# Patient Record
Sex: MALE | Race: WHITE | NOT HISPANIC OR LATINO
[De-identification: names, ages, dates, MRNs, and addresses within clinical notes are randomized per-mention and may not be internally consistent; named-entity substitution may affect disease eponyms.]

---

## 2018-07-28 PROBLEM — Z00.00 ENCOUNTER FOR PREVENTIVE HEALTH EXAMINATION: Status: ACTIVE | Noted: 2018-07-28

## 2018-11-19 ENCOUNTER — NON-APPOINTMENT (OUTPATIENT)
Age: 83
End: 2018-11-19

## 2018-11-19 ENCOUNTER — APPOINTMENT (OUTPATIENT)
Dept: CARDIOLOGY | Facility: CLINIC | Age: 83
End: 2018-11-19
Payer: MEDICARE

## 2018-11-19 VITALS
SYSTOLIC BLOOD PRESSURE: 155 MMHG | DIASTOLIC BLOOD PRESSURE: 84 MMHG | HEART RATE: 76 BPM | OXYGEN SATURATION: 97 % | HEIGHT: 69 IN | WEIGHT: 200 LBS | BODY MASS INDEX: 29.62 KG/M2

## 2018-11-19 DIAGNOSIS — Z78.9 OTHER SPECIFIED HEALTH STATUS: ICD-10-CM

## 2018-11-19 DIAGNOSIS — Z86.39 PERSONAL HISTORY OF OTHER ENDOCRINE, NUTRITIONAL AND METABOLIC DISEASE: ICD-10-CM

## 2018-11-19 DIAGNOSIS — N40.0 BENIGN PROSTATIC HYPERPLASIA WITHOUT LOWER URINARY TRACT SYMPMS: ICD-10-CM

## 2018-11-19 PROCEDURE — 99204 OFFICE O/P NEW MOD 45 MIN: CPT

## 2018-11-19 PROCEDURE — 93000 ELECTROCARDIOGRAM COMPLETE: CPT

## 2018-11-19 RX ORDER — TIMOLOL MALEATE 5 MG/ML
0.5 SOLUTION OPHTHALMIC
Refills: 0 | Status: ACTIVE | COMMUNITY

## 2018-11-19 NOTE — PHYSICAL EXAM
[General Appearance - Well Developed] : well developed [Normal Appearance] : normal appearance [Well Groomed] : well groomed [General Appearance - Well Nourished] : well nourished [No Deformities] : no deformities [General Appearance - In No Acute Distress] : no acute distress [Normal Conjunctiva] : the conjunctiva exhibited no abnormalities [Eyelids - No Xanthelasma] : the eyelids demonstrated no xanthelasmas [Normal Oral Mucosa] : normal oral mucosa [No Oral Pallor] : no oral pallor [No Oral Cyanosis] : no oral cyanosis [Normal Jugular Venous A Waves Present] : normal jugular venous A waves present [Normal Jugular Venous V Waves Present] : normal jugular venous V waves present [No Jugular Venous Dixon A Waves] : no jugular venous dixon A waves [Heart Rate And Rhythm] : heart rate and rhythm were normal [Heart Sounds] : normal S1 and S2 [Murmurs] : no murmurs present [Respiration, Rhythm And Depth] : normal respiratory rhythm and effort [Exaggerated Use Of Accessory Muscles For Inspiration] : no accessory muscle use [Auscultation Breath Sounds / Voice Sounds] : lungs were clear to auscultation bilaterally [Abdomen Soft] : soft [Abdomen Tenderness] : non-tender [Abdomen Mass (___ Cm)] : no abdominal mass palpated [Abnormal Walk] : normal gait [Gait - Sufficient For Exercise Testing] : the gait was sufficient for exercise testing [Nail Clubbing] : no clubbing of the fingernails [Cyanosis, Localized] : no localized cyanosis [Petechial Hemorrhages (___cm)] : no petechial hemorrhages [Skin Color & Pigmentation] : normal skin color and pigmentation [] : no rash [No Venous Stasis] : no venous stasis [Skin Lesions] : no skin lesions [No Skin Ulcers] : no skin ulcer [No Xanthoma] : no  xanthoma was observed [Oriented To Time, Place, And Person] : oriented to person, place, and time [Affect] : the affect was normal [Mood] : the mood was normal [No Anxiety] : not feeling anxious

## 2018-11-19 NOTE — REASON FOR VISIT
[Initial Evaluation] : an initial evaluation of [Medication Management] : Medication management [Abnormal ECG] : an abnormal ECG [Hyperlipidemia] : hyperlipidemia

## 2018-11-20 NOTE — HISTORY OF PRESENT ILLNESS
[FreeTextEntry1] : KEVIN RON  is a 84 year old  M\par Self referred for CV risk assessment\par h/o HL, borderline HTN, pneumonia, overweight, alcohol misuse, overweight\par Takes preventive crestor rx\par \par Previously told of scar in heart ?!\par There is no prior history of a clinical myocardial infarction, coronary revascularization. \par There is no history of symptomatic congestive heart failure rheumatic heart disease or valvular disease.\par There is no history of symptomatic arrhythmias including atrial fibrillation.\par \par There is no exertional chest pain, pressure or discomfort. \par There is no significant dyspnea on exertion or orthopnea. \par There are no symptomatic palpitations, lightheadedness, dizziness or syncope.\par Inactive due to care of wife\par Rides cart at course\par \par Day in [place], \par \par EKG NSR RVCD\par \par 10.18 hgb 15.0, , LDL 57

## 2018-11-20 NOTE — ASSESSMENT
[FreeTextEntry1] : Scar in heart?\par h/o HL\par Borderline EKG\par Borderline BP\par \par Follow up echo, stress echo, carotid\par Increase exercise\par Decrease alcohol

## 2019-04-23 ENCOUNTER — APPOINTMENT (OUTPATIENT)
Dept: CARDIOLOGY | Facility: CLINIC | Age: 84
End: 2019-04-23
Payer: MEDICARE

## 2019-04-23 PROCEDURE — 93979 VASCULAR STUDY: CPT

## 2019-04-23 PROCEDURE — 93880 EXTRACRANIAL BILAT STUDY: CPT

## 2019-04-23 PROCEDURE — 93306 TTE W/DOPPLER COMPLETE: CPT

## 2019-04-24 ENCOUNTER — TRANSCRIPTION ENCOUNTER (OUTPATIENT)
Age: 84
End: 2019-04-24

## 2019-05-07 ENCOUNTER — APPOINTMENT (OUTPATIENT)
Dept: CARDIOLOGY | Facility: CLINIC | Age: 84
End: 2019-05-07
Payer: MEDICARE

## 2019-05-07 VITALS
HEIGHT: 69 IN | OXYGEN SATURATION: 97 % | WEIGHT: 190 LBS | HEART RATE: 65 BPM | DIASTOLIC BLOOD PRESSURE: 74 MMHG | BODY MASS INDEX: 28.14 KG/M2 | SYSTOLIC BLOOD PRESSURE: 122 MMHG

## 2019-05-07 PROCEDURE — 99214 OFFICE O/P EST MOD 30 MIN: CPT

## 2019-05-07 RX ORDER — SACCHAROMYCES BOULARDII 50 MG
CAPSULE ORAL
Refills: 0 | Status: ACTIVE | COMMUNITY

## 2019-05-07 NOTE — REASON FOR VISIT
[Initial Evaluation] : an initial evaluation of [Hyperlipidemia] : hyperlipidemia [Abnormal ECG] : an abnormal ECG [Medication Management] : Medication management

## 2019-05-07 NOTE — PHYSICAL EXAM
[General Appearance - Well Developed] : well developed [Normal Appearance] : normal appearance [Well Groomed] : well groomed [General Appearance - Well Nourished] : well nourished [General Appearance - In No Acute Distress] : no acute distress [No Deformities] : no deformities [Normal Conjunctiva] : the conjunctiva exhibited no abnormalities [Eyelids - No Xanthelasma] : the eyelids demonstrated no xanthelasmas [Normal Oral Mucosa] : normal oral mucosa [No Oral Pallor] : no oral pallor [No Oral Cyanosis] : no oral cyanosis [Normal Jugular Venous A Waves Present] : normal jugular venous A waves present [Normal Jugular Venous V Waves Present] : normal jugular venous V waves present [No Jugular Venous Dixon A Waves] : no jugular venous dixon A waves [Respiration, Rhythm And Depth] : normal respiratory rhythm and effort [Auscultation Breath Sounds / Voice Sounds] : lungs were clear to auscultation bilaterally [Exaggerated Use Of Accessory Muscles For Inspiration] : no accessory muscle use [Heart Sounds] : normal S1 and S2 [Heart Rate And Rhythm] : heart rate and rhythm were normal [Murmurs] : no murmurs present [Abdomen Soft] : soft [Abdomen Tenderness] : non-tender [Gait - Sufficient For Exercise Testing] : the gait was sufficient for exercise testing [Abnormal Walk] : normal gait [Abdomen Mass (___ Cm)] : no abdominal mass palpated [Nail Clubbing] : no clubbing of the fingernails [Petechial Hemorrhages (___cm)] : no petechial hemorrhages [Cyanosis, Localized] : no localized cyanosis [Skin Color & Pigmentation] : normal skin color and pigmentation [] : no ischemic changes [No Skin Ulcers] : no skin ulcer [Skin Lesions] : no skin lesions [No Venous Stasis] : no venous stasis [Oriented To Time, Place, And Person] : oriented to person, place, and time [No Xanthoma] : no  xanthoma was observed [Affect] : the affect was normal [Mood] : the mood was normal [No Anxiety] : not feeling anxious

## 2019-05-10 NOTE — ASSESSMENT
[FreeTextEntry1] : \par Scar in heart?\par h/o HL\par Borderline EKG\par Borderline BP\par Atherosclerosis\par Mild VHD\par \par Recent noninvasive testing has been reviewed\par Stress test will be performed to evaluate myocardial perfusion\par \par Increase exercise\par Decrease alcohol

## 2019-05-10 NOTE — HISTORY OF PRESENT ILLNESS
[FreeTextEntry1] : KEVIN RON  is a 84 year old  M\par h/o HL, borderline HTN, pneumonia, overweight, alcohol misuse, overweight, atherosclerosis, ,mild VHD\par Takes preventive crestor rx\par \par Previously told of scar in heart ?!\par There is no prior history of a clinical myocardial infarction, coronary revascularization. \par There is no history of symptomatic congestive heart failure rheumatic heart disease \par There is no history of symptomatic arrhythmias including atrial fibrillation.\par \par Excessive fatigue. At times she feels low energy. \par He is drinking less alcohol.\par There is no exertional chest pain, pressure or discomfort. \par There is no significant dyspnea on exertion or orthopnea. \par There are no symptomatic palpitations, lightheadedness, dizziness or syncope.\par Inactive due to care of wife\par Rides cart at course\par \par Day in [place], [facility]\par \par Abd ultrasound. No aneurysm. Mild plaque \par Echocardiogram. Normal left ventricular function. Mild mitral regurgitation.\par Carotid Doppler. Mild to moderate atherosclerosis\par \par EKG NSR RVCD\par \par 10.18 hgb 15.0, , LDL 57\par

## 2019-05-21 ENCOUNTER — APPOINTMENT (OUTPATIENT)
Dept: CARDIOLOGY | Facility: CLINIC | Age: 84
End: 2019-05-21
Payer: MEDICARE

## 2019-05-21 PROCEDURE — 78452 HT MUSCLE IMAGE SPECT MULT: CPT

## 2019-05-21 PROCEDURE — 93015 CV STRESS TEST SUPVJ I&R: CPT

## 2019-05-21 PROCEDURE — A9502: CPT

## 2019-06-03 ENCOUNTER — APPOINTMENT (OUTPATIENT)
Dept: CARDIOLOGY | Facility: CLINIC | Age: 84
End: 2019-06-03
Payer: MEDICARE

## 2019-06-03 VITALS
BODY MASS INDEX: 28.44 KG/M2 | OXYGEN SATURATION: 96 % | DIASTOLIC BLOOD PRESSURE: 70 MMHG | WEIGHT: 192 LBS | HEIGHT: 69 IN | SYSTOLIC BLOOD PRESSURE: 122 MMHG | HEART RATE: 79 BPM

## 2019-06-03 PROCEDURE — 99214 OFFICE O/P EST MOD 30 MIN: CPT

## 2019-06-03 NOTE — PHYSICAL EXAM
[General Appearance - Well Developed] : well developed [Normal Appearance] : normal appearance [Well Groomed] : well groomed [General Appearance - Well Nourished] : well nourished [General Appearance - In No Acute Distress] : no acute distress [No Deformities] : no deformities [Normal Conjunctiva] : the conjunctiva exhibited no abnormalities [Normal Oral Mucosa] : normal oral mucosa [Eyelids - No Xanthelasma] : the eyelids demonstrated no xanthelasmas [No Oral Pallor] : no oral pallor [No Oral Cyanosis] : no oral cyanosis [Normal Jugular Venous A Waves Present] : normal jugular venous A waves present [Normal Jugular Venous V Waves Present] : normal jugular venous V waves present [No Jugular Venous Dixon A Waves] : no jugular venous dixon A waves [Respiration, Rhythm And Depth] : normal respiratory rhythm and effort [Exaggerated Use Of Accessory Muscles For Inspiration] : no accessory muscle use [Auscultation Breath Sounds / Voice Sounds] : lungs were clear to auscultation bilaterally [Heart Rate And Rhythm] : heart rate and rhythm were normal [Heart Sounds] : normal S1 and S2 [Abdomen Soft] : soft [Murmurs] : no murmurs present [Abdomen Tenderness] : non-tender [Abdomen Mass (___ Cm)] : no abdominal mass palpated [Gait - Sufficient For Exercise Testing] : the gait was sufficient for exercise testing [Abnormal Walk] : normal gait [Nail Clubbing] : no clubbing of the fingernails [Cyanosis, Localized] : no localized cyanosis [Petechial Hemorrhages (___cm)] : no petechial hemorrhages [] : no rash [Skin Color & Pigmentation] : normal skin color and pigmentation [Skin Lesions] : no skin lesions [No Venous Stasis] : no venous stasis [No Skin Ulcers] : no skin ulcer [Oriented To Time, Place, And Person] : oriented to person, place, and time [No Xanthoma] : no  xanthoma was observed [No Anxiety] : not feeling anxious [Affect] : the affect was normal [Mood] : the mood was normal

## 2019-06-03 NOTE — ADDENDUM
[FreeTextEntry1] : Please note the patient was reviewed with YUSEF Lopez.\par I was physically present during the service of the patient\par I was directly involved in the management plan and recommendations of care provided to the patient. \par I personally reviewed the history and physical exam and examination as documented by the PA above.\par 06/03/2019\par

## 2019-06-03 NOTE — HISTORY OF PRESENT ILLNESS
[FreeTextEntry1] : KEVIN RON  is a 85 year M  who presents today Jun 03, 2019 in clinical follow-up. \par He was last seen in our office on May 7, 2019.  At that time nuclear stress test recommended.  Testing performed and he presents today to review the results.  Since I seen there has been no recent illness or hospital stay.  Clinically he is doing well.  Continues to remain active with stationary bike and playing golf.  No new exertional complaints.\par \par h/o HL, borderline HTN, pneumonia, overweight, alcohol misuse, overweight, atherosclerosis, ,mild VHD\par Takes preventive crestor rx\par \par Previously told of scar in heart ?!\par There is no prior history of a clinical myocardial infarction, coronary revascularization. \par There is no history of symptomatic congestive heart failure rheumatic heart disease \par There is no history of symptomatic arrhythmias including atrial fibrillation.\par \par There is no exertional chest pain, pressure or discomfort. \par There is no significant dyspnea on exertion or orthopnea. \par There are no symptomatic palpitations, lightheadedness, dizziness or syncope.\par \par Day in [place], [facility]\par \par Nuclear stress test May 2019 with no ischemia or infarct\par \par Abd ultrasound 4/23/19 No aneurysm. Mild plaque \par Echocardiogram 4/23/19 Normal left ventricular function. Mild mitral regurgitation.\par Carotid Doppler 4/23/19 Mild to moderate atherosclerosis\par \par EKG NSR RVCD\par \par 10.18 hgb 15.0, , LDL 57\par

## 2019-06-03 NOTE — ASSESSMENT
[FreeTextEntry1] : KEVIN RON  is a 85 year M  who presents today Jun 03, 2019 with the above history and the following active issues. \par \par Previously told of a scar in heart. Nuclear stress test without ischemia or evidence of infarct. \par Limitations of non-invasive testing reviewed.  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 Surveillance monitoring.  Does not require SBE prophylaxis.\par \par Increase exercise\par Decrease alcohol\par \par Red flag symptoms which would warrant sooner emergent evaluation reviewed with the patient. \par Questions and concerns were addressed and answered. \par \par Sincerely,\par \par Sayra Lopez PA-C\par Patients history, testing and plan reviewed with supervising MD: Dr. Noel Lindquist

## 2019-09-25 ENCOUNTER — APPOINTMENT (OUTPATIENT)
Dept: CARDIOLOGY | Facility: CLINIC | Age: 84
End: 2019-09-25
Payer: MEDICARE

## 2019-09-25 VITALS
DIASTOLIC BLOOD PRESSURE: 62 MMHG | WEIGHT: 190 LBS | BODY MASS INDEX: 28.14 KG/M2 | OXYGEN SATURATION: 96 % | HEIGHT: 69 IN | HEART RATE: 73 BPM | SYSTOLIC BLOOD PRESSURE: 122 MMHG

## 2019-09-25 PROCEDURE — 99214 OFFICE O/P EST MOD 30 MIN: CPT

## 2019-09-26 NOTE — PHYSICAL EXAM
[Normal Appearance] : normal appearance [General Appearance - Well Developed] : well developed [Well Groomed] : well groomed [General Appearance - Well Nourished] : well nourished [No Deformities] : no deformities [General Appearance - In No Acute Distress] : no acute distress [Normal Conjunctiva] : the conjunctiva exhibited no abnormalities [Eyelids - No Xanthelasma] : the eyelids demonstrated no xanthelasmas [Normal Oral Mucosa] : normal oral mucosa [No Oral Cyanosis] : no oral cyanosis [No Oral Pallor] : no oral pallor [Normal Jugular Venous V Waves Present] : normal jugular venous V waves present [Normal Jugular Venous A Waves Present] : normal jugular venous A waves present [No Jugular Venous Dixon A Waves] : no jugular venous dixon A waves [Auscultation Breath Sounds / Voice Sounds] : lungs were clear to auscultation bilaterally [Respiration, Rhythm And Depth] : normal respiratory rhythm and effort [Exaggerated Use Of Accessory Muscles For Inspiration] : no accessory muscle use [Heart Sounds] : normal S1 and S2 [Heart Rate And Rhythm] : heart rate and rhythm were normal [Abdomen Soft] : soft [Murmurs] : no murmurs present [Abdomen Tenderness] : non-tender [Abdomen Mass (___ Cm)] : no abdominal mass palpated [Abnormal Walk] : normal gait [Gait - Sufficient For Exercise Testing] : the gait was sufficient for exercise testing [Nail Clubbing] : no clubbing of the fingernails [Cyanosis, Localized] : no localized cyanosis [Petechial Hemorrhages (___cm)] : no petechial hemorrhages [] : no rash [Skin Color & Pigmentation] : normal skin color and pigmentation [No Venous Stasis] : no venous stasis [Skin Lesions] : no skin lesions [No Skin Ulcers] : no skin ulcer [No Xanthoma] : no  xanthoma was observed [Mood] : the mood was normal [Affect] : the affect was normal [Oriented To Time, Place, And Person] : oriented to person, place, and time [No Anxiety] : not feeling anxious

## 2019-09-27 NOTE — HISTORY OF PRESENT ILLNESS
[FreeTextEntry1] : KEVIN RON  is a 85 year old  M\par \par \par \par h/o HL, overweight, alcohol misuse, atherosclerosis, ,mild VHD\par Takes preventive crestor rx\par \par There is no prior history of a clinical myocardial infarction, coronary revascularization. \par There is no history of symptomatic congestive heart failure rheumatic heart disease \par There is no history of symptomatic arrhythmias including atrial fibrillation.\par \par At times he feels low energy.    Takes a nap.\par He is drinking less alcohol.\par There is no exertional chest pain, pressure or discomfort. \par There is no significant dyspnea on exertion or orthopnea. \par There are no symptomatic palpitations, lightheadedness, dizziness or syncope.\par \par [facility] [place]\par \par Nuclear stress test May 2019 normal myocardial perfusion. \par Abd ultrasound. No aneurysm. Mild plaque \par Echocardiogram. Normal left ventricular function. Mild mitral regurgitation.\par Carotid Doppler. Mild to moderate atherosclerosis\par \par EKG NSR RVCD\par \par 10.18 hgb 15.0, , LDL 57\par \par

## 2019-09-27 NOTE — ASSESSMENT
[FreeTextEntry1] : h/o HL\par Atherosclerosis\par Mild VHD\par \par Blood work has been requested\par Does not require SBE prophylaxis\par Continue statin therapy. Adjunctive dietary measures. No adverse effects.\par \par Increase exercise\par Decrease alcohol\par \par Further cardiovascular testing as guided by symptoms.\par \par

## 2019-10-01 ENCOUNTER — MEDICATION RENEWAL (OUTPATIENT)
Age: 84
End: 2019-10-01

## 2019-10-01 RX ORDER — ROSUVASTATIN CALCIUM 5 MG/1
5 TABLET, FILM COATED ORAL DAILY
Qty: 90 | Refills: 1 | Status: DISCONTINUED | COMMUNITY
End: 2019-10-01

## 2019-10-04 ENCOUNTER — APPOINTMENT (OUTPATIENT)
Dept: CARDIOLOGY | Facility: CLINIC | Age: 84
End: 2019-10-04

## 2019-11-19 ENCOUNTER — MEDICATION RENEWAL (OUTPATIENT)
Age: 84
End: 2019-11-19

## 2020-08-25 ENCOUNTER — APPOINTMENT (OUTPATIENT)
Dept: CARDIOLOGY | Facility: CLINIC | Age: 85
End: 2020-08-25
Payer: MEDICARE

## 2020-08-25 ENCOUNTER — NON-APPOINTMENT (OUTPATIENT)
Age: 85
End: 2020-08-25

## 2020-08-25 VITALS
HEIGHT: 69 IN | SYSTOLIC BLOOD PRESSURE: 108 MMHG | DIASTOLIC BLOOD PRESSURE: 60 MMHG | OXYGEN SATURATION: 97 % | BODY MASS INDEX: 27.99 KG/M2 | WEIGHT: 189 LBS | HEART RATE: 82 BPM

## 2020-08-25 PROCEDURE — 99214 OFFICE O/P EST MOD 30 MIN: CPT

## 2020-08-25 PROCEDURE — 93000 ELECTROCARDIOGRAM COMPLETE: CPT

## 2020-08-25 RX ORDER — TAMSULOSIN HYDROCHLORIDE 0.4 MG/1
0.4 CAPSULE ORAL
Qty: 90 | Refills: 3 | Status: DISCONTINUED | COMMUNITY
Start: 1900-01-01 | End: 2020-08-25

## 2020-08-25 NOTE — HISTORY OF PRESENT ILLNESS
[FreeTextEntry1] : KEVIN RON  is a 86 year old  M\par h/o HL, overweight, alcohol misuse, atherosclerosis, ,mild VHD\par Takes preventive crestor rx\par \par There is no prior history of a clinical myocardial infarction, coronary revascularization. \par There is no history of symptomatic congestive heart failure rheumatic heart disease \par There is no history of symptomatic arrhythmias including atrial fibrillation.\par \par There is no exertional chest pain, pressure or discomfort. \par There is no significant dyspnea on exertion or orthopnea. \par There are no symptomatic palpitations, lightheadedness, dizziness or syncope.\par \par Main complaint is related to fatigue \par Drinks a bottle of wine a day\par \par Day in [place], [facility]\par \par Nuclear stress test May 2019 normal myocardial perfusion. \par Abd ultrasound. No aneurysm. Mild plaque \par Echocardiogram. Normal left ventricular function. Mild mitral regurgitation.\par Carotid Doppler. Mild to moderate atherosclerosis\par \par September 2019 hemoglobin 15.4 potassium 3.9 creatinine 0.6 HDL 56 LDL 89 \par EKG demonstrates sinus rhythm with PVC and a first-degree AV block possible inferior MI of indeterminate age\par \par 10.18 hgb 15.0, , LDL 57

## 2020-08-25 NOTE — PHYSICAL EXAM
[General Appearance - Well Developed] : well developed [Normal Appearance] : normal appearance [Well Groomed] : well groomed [General Appearance - Well Nourished] : well nourished [No Deformities] : no deformities [General Appearance - In No Acute Distress] : no acute distress [Eyelids - No Xanthelasma] : the eyelids demonstrated no xanthelasmas [Normal Conjunctiva] : the conjunctiva exhibited no abnormalities [No Oral Pallor] : no oral pallor [Normal Oral Mucosa] : normal oral mucosa [Normal Jugular Venous V Waves Present] : normal jugular venous V waves present [Normal Jugular Venous A Waves Present] : normal jugular venous A waves present [No Oral Cyanosis] : no oral cyanosis [No Jugular Venous Dixon A Waves] : no jugular venous dixon A waves [Respiration, Rhythm And Depth] : normal respiratory rhythm and effort [Exaggerated Use Of Accessory Muscles For Inspiration] : no accessory muscle use [Heart Rate And Rhythm] : heart rate and rhythm were normal [Auscultation Breath Sounds / Voice Sounds] : lungs were clear to auscultation bilaterally [Heart Sounds] : normal S1 and S2 [Murmurs] : no murmurs present [Abdomen Soft] : soft [Abdomen Tenderness] : non-tender [Abdomen Mass (___ Cm)] : no abdominal mass palpated [Abnormal Walk] : normal gait [Gait - Sufficient For Exercise Testing] : the gait was sufficient for exercise testing [Cyanosis, Localized] : no localized cyanosis [Nail Clubbing] : no clubbing of the fingernails [Petechial Hemorrhages (___cm)] : no petechial hemorrhages [] : no ischemic changes [Skin Color & Pigmentation] : normal skin color and pigmentation [No Venous Stasis] : no venous stasis [Skin Lesions] : no skin lesions [No Skin Ulcers] : no skin ulcer [No Xanthoma] : no  xanthoma was observed [Mood] : the mood was normal [Affect] : the affect was normal [Oriented To Time, Place, And Person] : oriented to person, place, and time [No Anxiety] : not feeling anxious

## 2020-08-25 NOTE — ASSESSMENT
[FreeTextEntry1] : \par h/o HL\par Atherosclerosis\par Mild VHD\par \par Continue statin therapy \par Follow-up blood work has been requested \par EKG has been reviewed \par Asymptomatic for the PVC\par Adjunctive dietary measures. No adverse effects.\par Increase exercise\par Decrease alcohol\par \par Further cardiovascular testing as guided by symptoms.

## 2020-08-25 NOTE — REASON FOR VISIT
[Abnormal ECG] : an abnormal ECG [Initial Evaluation] : an initial evaluation of [Medication Management] : Medication management [Hyperlipidemia] : hyperlipidemia

## 2020-12-15 ENCOUNTER — RX RENEWAL (OUTPATIENT)
Age: 85
End: 2020-12-15

## 2020-12-15 ENCOUNTER — NON-APPOINTMENT (OUTPATIENT)
Age: 85
End: 2020-12-15

## 2021-04-29 ENCOUNTER — NON-APPOINTMENT (OUTPATIENT)
Age: 86
End: 2021-04-29

## 2021-08-18 ENCOUNTER — NON-APPOINTMENT (OUTPATIENT)
Age: 86
End: 2021-08-18

## 2021-08-18 ENCOUNTER — APPOINTMENT (OUTPATIENT)
Dept: CARDIOLOGY | Facility: CLINIC | Age: 86
End: 2021-08-18
Payer: MEDICARE

## 2021-08-18 VITALS
OXYGEN SATURATION: 98 % | TEMPERATURE: 98 F | BODY MASS INDEX: 28.58 KG/M2 | WEIGHT: 193 LBS | SYSTOLIC BLOOD PRESSURE: 114 MMHG | DIASTOLIC BLOOD PRESSURE: 60 MMHG | HEART RATE: 70 BPM | HEIGHT: 69 IN

## 2021-08-18 PROCEDURE — 99214 OFFICE O/P EST MOD 30 MIN: CPT

## 2021-08-18 PROCEDURE — 93000 ELECTROCARDIOGRAM COMPLETE: CPT

## 2021-08-18 RX ORDER — SILODOSIN 8 MG/1
8 CAPSULE ORAL DAILY
Refills: 0 | Status: DISCONTINUED | COMMUNITY
End: 2021-08-18

## 2021-08-22 NOTE — ASSESSMENT
[FreeTextEntry1] : baseline abnormal EKG \par no new cardiovascular symptoms \par follow-up carotid Doppler \par \par \par h/o HL\par Atherosclerosis\par Mild VHD\par \par Continue statin therapy \par Adjunctive dietary measures. No adverse effects.\par Increase exercise\par Decrease alcohol\par needs follow-up of elevated PSA with primary physician\par Further cardiovascular testing as guided by symptom\par \par

## 2021-08-22 NOTE — HISTORY OF PRESENT ILLNESS
[FreeTextEntry1] : KEVIN RON  is a 87 year old  M\par h/o HL, overweight, alcohol misuse, atherosclerosis, ,mild VHD\par Takes preventive crestor rx\par \par There is no prior history of a clinical myocardial infarction, coronary revascularization. \par There is no history of symptomatic congestive heart failure rheumatic heart disease \par There is no history of symptomatic arrhythmias including atrial fibrillation.\par \par There is no exertional chest pain, pressure or discomfort. \par There is no significant dyspnea on exertion or orthopnea. \par There are no symptomatic palpitations, lightheadedness, dizziness or syncope.\par \par he is active on his bike and barbells \par he often takes naps due to fatigue in the afternoon \par \par September 2020 hemoglobin 16.2 creatinine 0.6 LDL 64 TSH 3.2 April 2021 hemoglobin 16.2 creatinine 0.6  total cholesterol 240 PSA 13.6 TSH 2.2 \par EKG demonstrates normal sinus rhythm with first-degree AV block possible inferior MI \par last blood work August 2021 potassium 4.1 creatinine 0.50 cholesterol 149 LDL 58\par \par Day in [place], [facility]\par \par Nuclear stress test May 2019 normal myocardial perfusion. \par Abd ultrasound. No aneurysm. Mild plaque \par Echocardiogram. Normal left ventricular function. Mild mitral regurgitation.\par Carotid Doppler. Mild to moderate atherosclerosis\par s.

## 2021-09-17 ENCOUNTER — APPOINTMENT (OUTPATIENT)
Dept: CARDIOLOGY | Facility: CLINIC | Age: 86
End: 2021-09-17
Payer: MEDICARE

## 2021-09-17 PROCEDURE — 93880 EXTRACRANIAL BILAT STUDY: CPT

## 2022-07-11 ENCOUNTER — RESULT CHARGE (OUTPATIENT)
Age: 87
End: 2022-07-11

## 2022-07-12 ENCOUNTER — NON-APPOINTMENT (OUTPATIENT)
Age: 87
End: 2022-07-12

## 2022-07-12 ENCOUNTER — APPOINTMENT (OUTPATIENT)
Dept: CARDIOLOGY | Facility: CLINIC | Age: 87
End: 2022-07-12

## 2022-07-12 VITALS
HEART RATE: 61 BPM | DIASTOLIC BLOOD PRESSURE: 68 MMHG | BODY MASS INDEX: 25.77 KG/M2 | OXYGEN SATURATION: 95 % | TEMPERATURE: 97.1 F | SYSTOLIC BLOOD PRESSURE: 98 MMHG | WEIGHT: 180 LBS | HEIGHT: 70 IN

## 2022-07-12 VITALS — SYSTOLIC BLOOD PRESSURE: 110 MMHG | DIASTOLIC BLOOD PRESSURE: 70 MMHG

## 2022-07-12 DIAGNOSIS — R94.31 ABNORMAL ELECTROCARDIOGRAM [ECG] [EKG]: ICD-10-CM

## 2022-07-12 DIAGNOSIS — R94.39 ABNORMAL RESULT OF OTHER CARDIOVASCULAR FUNCTION STUDY: ICD-10-CM

## 2022-07-12 PROCEDURE — 93000 ELECTROCARDIOGRAM COMPLETE: CPT

## 2022-07-12 PROCEDURE — 99214 OFFICE O/P EST MOD 30 MIN: CPT

## 2022-07-12 RX ORDER — ASPIRIN 81 MG
81 TABLET, DELAYED RELEASE (ENTERIC COATED) ORAL DAILY
Refills: 0 | Status: DISCONTINUED | COMMUNITY
End: 2022-07-12

## 2022-07-13 NOTE — ASSESSMENT
[FreeTextEntry1] : KEVIN RON is a 88 year old M who presents today Jul 12, 2022 with the above history and the following active issues: \par \par baseline abnormal EKG \par no new cardiovascular symptoms \par prior CV testing reviewed\par Further cardiovascular testing as guided by symptom\par \par h/o HL\par Atherosclerosis\par Mild VHD\par \par Prior lipids well controlled, repeat planned w/ PCP will be fwd to our office\par Continue statin therapy \par He stopped ASA, guidelines and R/B reviewed with patient. Shared decision making to continue to remain off ASA. \par Adjunctive dietary measures. No adverse effects.\par Increase exercise\par Decrease alcohol\par f/u elevated PSA with primary physician\par \par Pt advised to call our office if any new symptoms occur.\par \par Sincerely,\par \par JOSE Peterson\par Patients history, testing, and plan reviewed with supervising MD: Dr. Jhony Bob

## 2022-07-13 NOTE — HISTORY OF PRESENT ILLNESS
[FreeTextEntry1] : KEVIN RON  is a 89 year old  M\par h/o HL, overweight, alcohol misuse, atherosclerosis, mild VHD\par Takes preventive crestor rx\par \par There is no prior history of a clinical myocardial infarction, coronary revascularization. \par There is no history of symptomatic congestive heart failure rheumatic heart disease \par There is no history of symptomatic arrhythmias including atrial fibrillation.\par \par There is no exertional chest pain, pressure or discomfort. \par There is no significant dyspnea on exertion or orthopnea. \par There are no symptomatic palpitations, lightheadedness, dizziness or syncope.\par \par he is active on his bike and barbells, plays gold\par he often takes naps due to fatigue in the afternoon \par there is no recent change in his functional status\par There has been no recent illness or hospitalization. \par \par He was concerned about using aspirin so stopped in about 6 months ago. \par \par EKG 7/12/22 normal sinus rhythm with first degree AVB seen on priors\par \par last blood work August 2021 potassium 4.1 creatinine 0.50 cholesterol 149 LDL 58\par \par Day in [place], [facility]\par \par Nuclear stress test May 2019 normal myocardial perfusion. \par Abd ultrasound 2019. No aneurysm. Mild plaque \par Echocardiogram 2019. Normal left ventricular function. Mild mitral regurgitation.\par Carotid Doppler 2021. Mild to moderate atherosclerosis no sig change\par

## 2023-05-22 ENCOUNTER — APPOINTMENT (OUTPATIENT)
Dept: CARDIOLOGY | Facility: CLINIC | Age: 88
End: 2023-05-22
Payer: MEDICARE

## 2023-05-22 VITALS
SYSTOLIC BLOOD PRESSURE: 108 MMHG | HEART RATE: 66 BPM | HEIGHT: 70 IN | WEIGHT: 178 LBS | BODY MASS INDEX: 25.48 KG/M2 | DIASTOLIC BLOOD PRESSURE: 64 MMHG | OXYGEN SATURATION: 98 %

## 2023-05-22 DIAGNOSIS — I65.29 OCCLUSION AND STENOSIS OF UNSPECIFIED CAROTID ARTERY: ICD-10-CM

## 2023-05-22 PROCEDURE — 99214 OFFICE O/P EST MOD 30 MIN: CPT

## 2023-05-22 RX ORDER — FINASTERIDE 5 MG/1
5 TABLET, FILM COATED ORAL DAILY
Refills: 0 | Status: ACTIVE | COMMUNITY

## 2023-05-22 RX ORDER — TAMSULOSIN HYDROCHLORIDE 0.4 MG/1
0.4 CAPSULE ORAL DAILY
Refills: 0 | Status: ACTIVE | COMMUNITY

## 2023-05-28 NOTE — ASSESSMENT
[FreeTextEntry1] : \par h/o HL\par Atherosclerosis\par Mild VHD\par \par EKG and blood work have been reviewed \par further cardiovascular testing is guided by sx \par increase aerobic exercise \par reviewed clinical red flags which would warrant urgent reevaluation \par Continue preventive statin therapy.  \par Adjunctive dietary measures. No adverse effects.

## 2023-05-28 NOTE — HISTORY OF PRESENT ILLNESS
[FreeTextEntry1] : KEVIN RON  is a 89 year old  M\par h/o HL, overweight, alcohol misuse, atherosclerosis, mild VHD\par Takes preventive crestor rx\par \par There is no prior history of a clinical myocardial infarction, coronary revascularization. \par There is no history of symptomatic congestive heart failure rheumatic heart disease \par There is no history of symptomatic arrhythmias including atrial fibrillation.\par \par There is no exertional chest pain, pressure or discomfort. \par There is no significant dyspnea on exertion or orthopnea. \par There are no symptomatic palpitations, lightheadedness, dizziness or syncope.\par \par he is active on his bike and barbells, plays golf\par he often takes naps due to fatigue in the afternoon \par there is no recent change in his functional status\par There has been no recent illness or hospitalization.\par \par Overall he reports feeling remarkably well.  He is drinking less alcohol.  \par \par April Hemoglobin 15.8 creatinine 0.7 total cholesterol 169 LDL 60 \par Nuclear stress test May 2019 normal myocardial perfusion. \par Abd ultrasound 2019. No aneurysm. Mild plaque \par Echocardiogram 2019. Normal left ventricular function. Mild mitral regurgitation.\par Carotid Doppler 2021. Mild to moderate atherosclerosis no sig change\par \par

## 2023-07-14 ENCOUNTER — APPOINTMENT (OUTPATIENT)
Dept: ORTHOPEDIC SURGERY | Facility: CLINIC | Age: 88
End: 2023-07-14

## 2023-07-26 ENCOUNTER — OFFICE (OUTPATIENT)
Dept: URBAN - METROPOLITAN AREA CLINIC 8 | Facility: CLINIC | Age: 88
Setting detail: OPHTHALMOLOGY
End: 2023-07-26
Payer: MEDICARE

## 2023-07-26 ENCOUNTER — RX ONLY (RX ONLY)
Age: 88
End: 2023-07-26

## 2023-07-26 DIAGNOSIS — H21.232: ICD-10-CM

## 2023-07-26 DIAGNOSIS — Z96.1: ICD-10-CM

## 2023-07-26 DIAGNOSIS — H40.1131: ICD-10-CM

## 2023-07-26 DIAGNOSIS — H52.4: ICD-10-CM

## 2023-07-26 DIAGNOSIS — H26.492: ICD-10-CM

## 2023-07-26 DIAGNOSIS — H16.223: ICD-10-CM

## 2023-07-26 DIAGNOSIS — H35.373: ICD-10-CM

## 2023-07-26 PROCEDURE — 99214 OFFICE O/P EST MOD 30 MIN: CPT | Performed by: OPHTHALMOLOGY

## 2023-07-26 PROCEDURE — 92015 DETERMINE REFRACTIVE STATE: CPT | Performed by: OPHTHALMOLOGY

## 2023-07-26 PROCEDURE — 92134 CPTRZ OPH DX IMG PST SGM RTA: CPT | Performed by: OPHTHALMOLOGY

## 2023-07-26 ASSESSMENT — REFRACTION_MANIFEST
OD_SPHERE: PLANO
OS_ADD: +3.00
OS_SPHERE: PLANO
OU_VA: 20/20-2
OS_VA2: 20/20(J1+)
OS_ADD: +3.00
OD_ADD: +3.00
OD_CYLINDER: -1.50
OD_AXIS: 130
OS_CYLINDER: -0.75
OS_VA2: 20/20(J1+)
OS_CYLINDER: -0.75
OU_VA: 20/20-2
OD_AXIS: 130
OD_VA2: 20/20(J1+)
OD_VA2: 20/20(J1+)
OD_ADD: +3.00
OS_AXIS: 120
OD_SPHERE: PLANO
OD_CYLINDER: -1.50
OS_SPHERE: PLANO
OS_AXIS: 120

## 2023-07-26 ASSESSMENT — CONFRONTATIONAL VISUAL FIELD TEST (CVF)
OD_FINDINGS: FULL
OS_FINDINGS: FULL

## 2023-07-26 ASSESSMENT — REFRACTION_AUTOREFRACTION
OD_SPHERE: -0.25
OS_AXIS: 118
OS_SPHERE: -0.25
OD_CYLINDER: -1.50
OS_CYLINDER: -0.75
OD_AXIS: 131

## 2023-07-26 ASSESSMENT — REFRACTION_CURRENTRX
OS_AXIS: 100
OD_CYLINDER: -1.75
OS_ADD: +3.00
OS_CYLINDER: -0.75
OS_CYLINDER: -0.75
OD_SPHERE: PLANO
OD_SPHERE: +0.25
OD_OVR_VA: 20/
OS_SPHERE: +0.25
OD_VPRISM_DIRECTION: BF
OD_OVR_VA: 20/
OD_CYLINDER: -2.00
OD_AXIS: 127
OD_AXIS: 124
OD_ADD: +3.00
OS_SPHERE: PLANO
OS_OVR_VA: 20/
OS_VPRISM_DIRECTION: BF
OS_AXIS: 100
OS_OVR_VA: 20/

## 2023-07-26 ASSESSMENT — AXIALLENGTH_DERIVED
OS_AL: 23.3332
OD_AL: 23.5226

## 2023-07-26 ASSESSMENT — TONOMETRY
OS_IOP_MMHG: 14
OD_IOP_MMHG: 14

## 2023-07-26 ASSESSMENT — KERATOMETRY
METHOD_AUTO_MANUAL: AUTO
OS_K1POWER_DIOPTERS: 44.50
OS_AXISANGLE_DEGREES: 036
OD_K1POWER_DIOPTERS: 44.25
OD_AXISANGLE_DEGREES: 050
OS_K2POWER_DIOPTERS: 45.25
OD_K2POWER_DIOPTERS: 45.25

## 2023-07-26 ASSESSMENT — PACHYMETRY
OS_CT_CORRECTION: 2
OD_CT_CORRECTION: 2
OD_CT_UM: 514
OS_CT_UM: 513

## 2023-07-26 ASSESSMENT — CORNEAL DYSTROPHY - POSTERIOR
OS_POSTERIORDYSTROPHY: GUTTATA
OD_POSTERIORDYSTROPHY: GUTTATA

## 2023-07-26 ASSESSMENT — SUPERFICIAL PUNCTATE KERATITIS (SPK)
OD_SPK: T
OS_SPK: T

## 2023-07-26 ASSESSMENT — SPHEQUIV_DERIVED
OS_SPHEQUIV: -0.625
OD_SPHEQUIV: -1

## 2023-07-26 ASSESSMENT — VISUAL ACUITY
OS_BCVA: 20/20-2
OD_BCVA: 20/20-2

## 2023-12-04 ENCOUNTER — NON-APPOINTMENT (OUTPATIENT)
Age: 88
End: 2023-12-04

## 2023-12-04 ENCOUNTER — APPOINTMENT (OUTPATIENT)
Dept: CARDIOLOGY | Facility: CLINIC | Age: 88
End: 2023-12-04
Payer: MEDICARE

## 2023-12-04 VITALS
HEART RATE: 66 BPM | DIASTOLIC BLOOD PRESSURE: 68 MMHG | BODY MASS INDEX: 25.34 KG/M2 | OXYGEN SATURATION: 99 % | HEIGHT: 70 IN | SYSTOLIC BLOOD PRESSURE: 114 MMHG | WEIGHT: 177 LBS

## 2023-12-04 DIAGNOSIS — I34.0 NONRHEUMATIC MITRAL (VALVE) INSUFFICIENCY: ICD-10-CM

## 2023-12-04 PROCEDURE — 93000 ELECTROCARDIOGRAM COMPLETE: CPT

## 2023-12-04 PROCEDURE — 99213 OFFICE O/P EST LOW 20 MIN: CPT

## 2023-12-04 RX ORDER — NIRMATRELVIR AND RITONAVIR 300-100 MG
20 X 150 MG & KIT ORAL
Qty: 30 | Refills: 0 | Status: DISCONTINUED | COMMUNITY
Start: 2023-10-04

## 2023-12-04 RX ORDER — DORZOLAMIDE HYDROCHLORIDE TIMOLOL MALEATE 20; 5 MG/ML; MG/ML
2-0.5 SOLUTION/ DROPS OPHTHALMIC
Qty: 10 | Refills: 0 | Status: ACTIVE | COMMUNITY
Start: 2023-07-26

## 2023-12-04 RX ORDER — MUPIROCIN 20 MG/G
2 OINTMENT TOPICAL
Qty: 22 | Refills: 0 | Status: DISCONTINUED | COMMUNITY
Start: 2023-11-09

## 2023-12-04 RX ORDER — SILDENAFIL 100 MG/1
100 TABLET, FILM COATED ORAL
Qty: 30 | Refills: 0 | Status: DISCONTINUED | COMMUNITY
Start: 2023-08-21

## 2024-01-16 NOTE — HISTORY OF PRESENT ILLNESS
[FreeTextEntry1] : KEVIN RON  is a 89 year old  M ho HL, overweight, alcohol misuse, atherosclerosis, mild VHD Takes preventive crestor rx  There is no prior history of a clinical myocardial infarction, coronary revascularization. There is no history of symptomatic congestive heart failure rheumatic heart disease There is no history of symptomatic arrhythmias including atrial fibrillation.  There is no exertional chest pain, pressure or discomfort. There is no significant dyspnea on exertion or orthopnea. There are no symptomatic palpitations, lightheadedness, dizziness or syncope.  he is active on his bike and barbells, plays golf he often takes naps due to fatigue in the afternoon there is no recent change in his functional status There has been no recent illness or hospitalization. drinking less alcohol.  April Hemoglobin 15.8 creatinine 0.7 total cholesterol 169 LDL 60 Nuclear stress test May 2019 normal myocardial perfusion. Abd ultrasound 2019. No aneurysm. Mild plaque Echocardiogram 2019. Normal left ventricular function. Mild mitral regurgitation. Carotid Doppler 2021. Mild to moderate atherosclerosis no sig change

## 2024-01-16 NOTE — ASSESSMENT
[FreeTextEntry1] : h/o HL Atherosclerosis Mild VHD  Follow-up blood work has been requested.  Increase aerobic exercise.  Further cardiovascular testing as guided by symptoms  Continue preventive statin therapy. djunctive dietary measures. No adverse effects.  reviewed clinical red flags which would warrant urgent reevaluation

## 2024-04-02 LAB — HBA1C MFR BLD HPLC: 4.7

## 2024-04-03 ENCOUNTER — NON-APPOINTMENT (OUTPATIENT)
Age: 89
End: 2024-04-03

## 2024-06-10 ENCOUNTER — APPOINTMENT (OUTPATIENT)
Dept: CARDIOLOGY | Facility: CLINIC | Age: 89
End: 2024-06-10
Payer: MEDICARE

## 2024-06-10 VITALS
BODY MASS INDEX: 26.48 KG/M2 | WEIGHT: 185 LBS | DIASTOLIC BLOOD PRESSURE: 74 MMHG | SYSTOLIC BLOOD PRESSURE: 112 MMHG | OXYGEN SATURATION: 97 % | HEART RATE: 78 BPM | HEIGHT: 70 IN

## 2024-06-10 DIAGNOSIS — I70.0 ATHEROSCLEROSIS OF AORTA: ICD-10-CM

## 2024-06-10 DIAGNOSIS — E78.5 HYPERLIPIDEMIA, UNSPECIFIED: ICD-10-CM

## 2024-06-10 DIAGNOSIS — Z71.89 OTHER SPECIFIED COUNSELING: ICD-10-CM

## 2024-06-10 DIAGNOSIS — R53.81 OTHER MALAISE: ICD-10-CM

## 2024-06-10 DIAGNOSIS — R53.83 OTHER MALAISE: ICD-10-CM

## 2024-06-10 PROCEDURE — 99214 OFFICE O/P EST MOD 30 MIN: CPT

## 2024-06-10 RX ORDER — ROSUVASTATIN CALCIUM 10 MG/1
10 TABLET, FILM COATED ORAL
Qty: 90 | Refills: 3 | Status: ACTIVE | COMMUNITY
Start: 2019-10-01 | End: 1900-01-01

## 2024-06-10 NOTE — HISTORY OF PRESENT ILLNESS
[FreeTextEntry1] : KEVIN RON  is a 89 year old  M ho HL, overweight, alcohol misuse, atherosclerosis, mild VHD Takes preventive crestor rx Blood work April 2024 hemoglobin 14.8 creatinine 0.7 LDL 73 TSH 2.3 BNP 82 recently celebrated his 89th birthday.  He remains active though energy is low.  He goes to range.  Plays golf routinely.  Does light weights.  Follow-up ultrasound imaging and stress test.  Statin has been refilled.  Copy of his blood work has been provided for his primary physician.   There is no prior history of a clinical myocardial infarction, coronary revascularization. There is no history of symptomatic congestive heart failure rheumatic heart disease There is no history of symptomatic arrhythmias including atrial fibrillation.  There is no exertional chest pain, pressure or discomfort. There is no significant dyspnea on exertion or orthopnea. There are no symptomatic palpitations, lightheadedness, dizziness or syncope.  he is active on his bike and barbells, plays golf he often takes naps due to fatigue in the afternoon there is no recent change in his functional status There has been no recent illness or hospitalization. drinking less alcohol.  April Hemoglobin 15.8 creatinine 0.7 total cholesterol 169 LDL 60 Nuclear stress test May 2019 normal myocardial perfusion. Abd ultrasound 2019. No aneurysm. Mild plaque Echocardiogram 2019. Normal left ventricular function. Mild mitral regurgitation. Carotid Doppler 2021. Mild to moderate atherosclerosis no sig change  h/o HL Atherosclerosis Mild VHD  Follow-up blood work has been requested.  Increase aerobic exercise.  Further cardiovascular testing as guided by symptoms  Continue preventive statin therapy. djunctive dietary measures. No adverse effects.  reviewed clinical red flags which would warrant urgent reevaluation

## 2024-07-01 ENCOUNTER — APPOINTMENT (OUTPATIENT)
Dept: CARDIOLOGY | Facility: CLINIC | Age: 89
End: 2024-07-01
Payer: MEDICARE

## 2024-07-01 PROCEDURE — 93978 VASCULAR STUDY: CPT

## 2024-07-01 PROCEDURE — 93306 TTE W/DOPPLER COMPLETE: CPT

## 2024-07-02 ENCOUNTER — APPOINTMENT (OUTPATIENT)
Dept: CARDIOLOGY | Facility: CLINIC | Age: 89
End: 2024-07-02

## 2024-07-17 ENCOUNTER — OFFICE (OUTPATIENT)
Dept: URBAN - METROPOLITAN AREA CLINIC 8 | Facility: CLINIC | Age: 89
Setting detail: OPHTHALMOLOGY
End: 2024-07-17
Payer: MEDICARE

## 2024-07-17 DIAGNOSIS — Z96.1: ICD-10-CM

## 2024-07-17 DIAGNOSIS — H26.492: ICD-10-CM

## 2024-07-17 DIAGNOSIS — H35.373: ICD-10-CM

## 2024-07-17 DIAGNOSIS — H16.223: ICD-10-CM

## 2024-07-17 DIAGNOSIS — H21.232: ICD-10-CM

## 2024-07-17 DIAGNOSIS — H40.1131: ICD-10-CM

## 2024-07-17 PROCEDURE — 92133 CPTRZD OPH DX IMG PST SGM ON: CPT | Performed by: OPHTHALMOLOGY

## 2024-07-17 PROCEDURE — 92083 EXTENDED VISUAL FIELD XM: CPT | Performed by: OPHTHALMOLOGY

## 2024-07-17 PROCEDURE — 92014 COMPRE OPH EXAM EST PT 1/>: CPT | Performed by: OPHTHALMOLOGY

## 2024-07-17 ASSESSMENT — CONFRONTATIONAL VISUAL FIELD TEST (CVF)
OS_FINDINGS: FULL
OD_FINDINGS: FULL

## 2024-07-30 ENCOUNTER — APPOINTMENT (OUTPATIENT)
Dept: CARDIOLOGY | Facility: CLINIC | Age: 89
End: 2024-07-30
Payer: MEDICARE

## 2024-07-30 PROCEDURE — 78452 HT MUSCLE IMAGE SPECT MULT: CPT

## 2024-07-30 PROCEDURE — A9502: CPT

## 2024-07-30 PROCEDURE — 93015 CV STRESS TEST SUPVJ I&R: CPT

## 2024-08-28 ENCOUNTER — APPOINTMENT (OUTPATIENT)
Dept: CARDIOLOGY | Facility: CLINIC | Age: 89
End: 2024-08-28
Payer: MEDICARE

## 2024-08-28 VITALS
HEIGHT: 70 IN | DIASTOLIC BLOOD PRESSURE: 64 MMHG | BODY MASS INDEX: 25.77 KG/M2 | WEIGHT: 180 LBS | SYSTOLIC BLOOD PRESSURE: 104 MMHG | OXYGEN SATURATION: 99 % | HEART RATE: 76 BPM

## 2024-08-28 DIAGNOSIS — R94.31 ABNORMAL ELECTROCARDIOGRAM [ECG] [EKG]: ICD-10-CM

## 2024-08-28 DIAGNOSIS — I49.3 VENTRICULAR PREMATURE DEPOLARIZATION: ICD-10-CM

## 2024-08-28 DIAGNOSIS — I65.29 OCCLUSION AND STENOSIS OF UNSPECIFIED CAROTID ARTERY: ICD-10-CM

## 2024-08-28 DIAGNOSIS — R53.83 OTHER MALAISE: ICD-10-CM

## 2024-08-28 DIAGNOSIS — I34.0 NONRHEUMATIC MITRAL (VALVE) INSUFFICIENCY: ICD-10-CM

## 2024-08-28 DIAGNOSIS — R53.81 OTHER MALAISE: ICD-10-CM

## 2024-08-28 DIAGNOSIS — R94.39 ABNORMAL RESULT OF OTHER CARDIOVASCULAR FUNCTION STUDY: ICD-10-CM

## 2024-08-28 DIAGNOSIS — Z71.89 OTHER SPECIFIED COUNSELING: ICD-10-CM

## 2024-08-28 DIAGNOSIS — E78.5 HYPERLIPIDEMIA, UNSPECIFIED: ICD-10-CM

## 2024-08-28 PROCEDURE — 99214 OFFICE O/P EST MOD 30 MIN: CPT

## 2024-08-28 NOTE — HISTORY OF PRESENT ILLNESS
[FreeTextEntry1] : KEVIN RON  is a 89 year old  M  ho HL, overweight, alcohol misuse, atherosclerosis, mild VHD Takes preventive crestor rx  There is no prior history of a clinical myocardial infarction, coronary revascularization. There is no history of symptomatic congestive heart failure rheumatic heart disease There is no history of symptomatic arrhythmias including atrial fibrillation.  There is no exertional chest pain, pressure or discomfort. There is no significant dyspnea on exertion or orthopnea. There are no symptomatic palpitations, lightheadedness, dizziness or syncope.  he is active on his bike and barbells, plays golf he often takes naps due to fatigue in the afternoon there is no recent change in his functional status  recently celebrated his 89th birthday. He remains active though energy is low. He goes to range. Plays golf routinely. Does light weights.  Unfortunately had a mechanical fall last week. He was walking on a slippery deck and hit his face on wood. He has several stitches in place. ETOH level was elevated and CBC was stable on labs. He denies any preceding dizziness or other symptoms.   Echo 7/2024 DF 55-60% asc ao 3.9cm, arch 3.8cm, mild VHD. Abs scan no AAA mild atherosclerosis.  Nuclear stress test 7/2024 exercise 4 min 42 sec normal CV response. Small fixed defect s/o diaphragm attenuation. PVCs noted. No ischemia by EKG.  Blood work April 2024 hemoglobin 14.8 creatinine 0.7 LDL 73 TSH 2.3 BNP 82  April Hemoglobin 15.8 creatinine 0.7 total cholesterol 169 LDL 60 Carotid Doppler 2021. Mild to moderate atherosclerosis no sig change

## 2024-08-28 NOTE — ASSESSMENT
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   HL Atherosclerosis cont statin lipids controlled.   Mild VHD stable Surveillance monitoring   Fatigue TSH wnl CBC stable followup PCP re any further eval  followup regarding stiches, fall precautions, limit ETOH  reviewed clinical red flags which would warrant urgent reevaluation  Sincerely,  JOSE Huang Patients history, testing, and plan reviewed with supervising MD: Dr. Jhony Bob

## 2025-02-10 ENCOUNTER — APPOINTMENT (OUTPATIENT)
Dept: CARDIOLOGY | Facility: CLINIC | Age: 89
End: 2025-02-10
Payer: MEDICARE

## 2025-02-10 ENCOUNTER — NON-APPOINTMENT (OUTPATIENT)
Age: 89
End: 2025-02-10

## 2025-02-10 VITALS
DIASTOLIC BLOOD PRESSURE: 72 MMHG | SYSTOLIC BLOOD PRESSURE: 110 MMHG | HEART RATE: 87 BPM | WEIGHT: 191 LBS | HEIGHT: 70 IN | OXYGEN SATURATION: 98 % | BODY MASS INDEX: 27.35 KG/M2

## 2025-02-10 DIAGNOSIS — I70.0 ATHEROSCLEROSIS OF AORTA: ICD-10-CM

## 2025-02-10 DIAGNOSIS — E78.5 HYPERLIPIDEMIA, UNSPECIFIED: ICD-10-CM

## 2025-02-10 DIAGNOSIS — Z71.89 OTHER SPECIFIED COUNSELING: ICD-10-CM

## 2025-02-10 PROCEDURE — 99214 OFFICE O/P EST MOD 30 MIN: CPT

## 2025-02-10 PROCEDURE — 93000 ELECTROCARDIOGRAM COMPLETE: CPT

## 2025-06-09 ENCOUNTER — APPOINTMENT (OUTPATIENT)
Dept: CARDIOLOGY | Facility: CLINIC | Age: 89
End: 2025-06-09
Payer: MEDICARE

## 2025-06-09 PROCEDURE — 93306 TTE W/DOPPLER COMPLETE: CPT

## 2025-06-10 ENCOUNTER — NON-APPOINTMENT (OUTPATIENT)
Age: 89
End: 2025-06-10

## 2025-06-27 PROCEDURE — 93228 REMOTE 30 DAY ECG REV/REPORT: CPT

## 2025-06-28 LAB — HBA1C MFR BLD HPLC: 4.9
